# Patient Record
Sex: FEMALE | Race: WHITE
[De-identification: names, ages, dates, MRNs, and addresses within clinical notes are randomized per-mention and may not be internally consistent; named-entity substitution may affect disease eponyms.]

---

## 2018-06-11 ENCOUNTER — HOSPITAL ENCOUNTER (INPATIENT)
Dept: HOSPITAL 46 - FBCO | Age: 38
LOS: 2 days | Discharge: HOME | End: 2018-06-13
Attending: OBSTETRICS & GYNECOLOGY | Admitting: OBSTETRICS & GYNECOLOGY
Payer: COMMERCIAL

## 2018-06-11 VITALS — BODY MASS INDEX: 27.55 KG/M2 | HEIGHT: 65 IN | WEIGHT: 165.35 LBS

## 2018-06-11 DIAGNOSIS — N85.8: ICD-10-CM

## 2018-06-11 DIAGNOSIS — K21.9: ICD-10-CM

## 2018-06-11 DIAGNOSIS — O34.211: Primary | ICD-10-CM

## 2018-06-11 DIAGNOSIS — Z88.2: ICD-10-CM

## 2018-06-11 DIAGNOSIS — Z79.899: ICD-10-CM

## 2018-06-11 DIAGNOSIS — Z3A.37: ICD-10-CM

## 2018-06-11 PROCEDURE — C1763 CONN TISS, NON-HUMAN: HCPCS

## 2018-06-11 NOTE — OR
[facility]
                                    [address], [place]  [address]
_________________________________________________________________________________________
                                                                 Draft    
 
 
DATE OF OPERATION:
2018
 
SURGEON:
Kathleen Rivas MD
 
FIRST ASSISTANT:
Michael Carreno MD.
 
PREOPERATIVE DIAGNOSES:
Term pregnancy, previous  section, labor, breech presentation.
 
POSTOPERATIVE DIAGNOSES:
Term pregnancy, previous  section, labor, breech presentation, delivered.
 
PROCEDURE PERFORMED:
Repeat  section with lower segment transverse uterine incision.
 
ANESTHESIA:
Spinal.
 
ESTIMATED BLOOD LOSS:
500 mL.
 
DRAINS:
Chang catheter.
 
INDICATIONS AND FINDINGS:
The patient is a 37-year-old female,  2, para 1, admitted at 37 and 3/7th weeks
in early active labor.  Over course of observation, she had changed her cervix from 1 to
3 cm and was asia every 1 to 4 minutes.  Baby was also found to be breech.  At
the time of surgery, she was delivered of a little girl in a jesse breech via lower
segment transverse uterine incision with Apgars of 7 and 8 and weight is pending.
Uterus had some adhesions of the bladder flap over the anterior aspect of the uterus.
The uterus, tubes, ovaries, and placenta otherwise appeared normal.  There was a large
amount of fluid noted at delivery as well. 
 
DESCRIPTION OF PROCEDURE:
The patient was prepped and draped in the supine position.  The prior scar was removed
sharply.  The incision was extended down to the fascia.  The incision was extended
laterally and the inferior and superior fascial flaps were created.  The muscles were
sharply divided and the peritoneum entered sharply and the incision extended superiorly
 
                                                                                    
_________________________________________________________________________________________
PATIENT NAME:     GABRIELE CONCEPCION                        
MEDICAL RECORD #: [id number]            OPERATIVE REPORT              
          ACCT #: [id number]  
DATE OF BIRTH:   [birth year]            REPORT #: [id number]      
PHYSICIAN:        KATHLEEN RIVAS MD            
PCP:              KIMBERLEY BURGESS          
REPORT IS CONFIDENTIAL AND NOT TO BE RELEASED WITHOUT AUTHORIZATION
 
 
                                  [facility]
                                    [address], [place]  [address]
_________________________________________________________________________________________
                                                                 Draft    
 
 
and inferiorly.  The Rasheed retractor was then inserted.  Peritoneal adhesions were
filmy and were swept away digitally.  The uterine incision was made in the upper aspect
of the peritoneal reflection and the baby delivered with the above findings and handed
off to the pediatric staff in attendance.  The placenta was removed manually and the
uterus was explored with a lap tape assuring no remaining fragments.  The edges of the
incision were identified and the uterus was closed in 2 layers using #0 Monocryl.  The
first layer was a running locking stitch and the second was a vertical imbricating
stitch.  Bleeding points were controlled over the surface of the uterus from the
adhesions just with cautery.  The abdomen was copiously irrigated and inspected and
found to be hemostatic.  The retractor was removed and the peritoneum identified.  An
ACell graft was then laid over the lower segment to aid in healing.  The peritoneum was
then closed with a running suture of #3-0 Vicryl.  The muscles were brought together
with interrupted sutures of #0 Vicryl.  There was some bleeding at the upper raphae
underneath the fascia and this was controlled with two figure-of-eight sutures of #2-0
Vicryl.  Other bleeding points were controlled with cautery.  This layer was also
irrigated and seen to be hemostatic.  ACell powder was sprinkled over the muscles to aid
in healing.  The fascia was then closed from each angle to the midline with a running
suture of #0 Vicryl.  The subcu layer was irrigated and inspected and bleeding points
were controlled with cautery.  Interrupted sutures of #3-0 Vicryl were placed closing
the deep space.  The skin was closed with staples.  All sponge and needle counts were
correct.  She tolerated the procedure well and was taken to the recovery room in good
condition. 
 
 
 
            ________________________________________
            MD GRAHAM Goel/MODL
Job #:  [id number]
DD:  2018 15:17:13
DT:  2018 18:24:29
 
cc:            Michael Carreno MD
 
 
Copies:  MICHAEL CARRENO MD
~
 
 
 
 
                                                                                    
_________________________________________________________________________________________
PATIENT NAME:     GABRIELE CONCEPCION                        
MEDICAL RECORD #: [id number]            OPERATIVE REPORT              
          ACCT #: [id number]  
DATE OF BIRTH:   [birth year]            REPORT #: [id number]      
PHYSICIAN:        KATHLEEN RIVAS MD            
PCP:              KIMBERLEY BURGESS          
REPORT IS CONFIDENTIAL AND NOT TO BE RELEASED WITHOUT AUTHORIZATION

## 2018-06-11 NOTE — NUR
06/11/18 1519 Aliyah Moctezuma
1504 PT ARRIVED TO ROOM DROWSY, RESP EVEN AND UNLABORED ON RA. VSS. PT
DENIES AND NAUSEA. PT EDUCATION GIVEN ON RECOVERY PROCESS.
 
1515 HOB INCREASED TO 20 DEGREES, BP STABLE AND PT DENIES ANY PROBLEMS
AT HTIS TIME.

## 2018-06-13 NOTE — PR
[facility]
                                    [address] [facility]
                                  [place], [place]  [address]
_________________________________________________________________________________________
                                                                 Signed   
 
 
===================================
PP Progress Notes
===================================
Datetime Report Generated by CPTERRENCE: 2018 06:53
   
SUBJECTIVE:  [id number]
Pain:  Within normal limits
Nausea/Vomiting:  Denies
Flatus:  Yes
Vital Signs:  [id number]
Vital Signs:  Reviewed; Within Normal Limits
POSTPARTUM EXAM:  [id number]
Cardiovascular:  Not Done
Respiratory:  Not Done
Abdomen/Uterus:  Abnormal
Lochia:  Normal
Vulva/Perineum:  Not Done
Breasts:  Not Done
CVA Tenderness:  Not Done
Extremities:  Normal
 Incision:  Normal
Breastfeeding Progress:  Abnormal
Exam Comments:  Fundus firm, NT @ U-2.
IMPRESSION/PLAN/PROCEDURES:  [id number]
Impression:  Normal postpartum progression
Plan:  Remove staples; Discharge
Other Plans:  ambulate, shower
Procedures:  None
Progress Notes:  Doing well.  She is ready for D/C.
Signing Physician:  Kathleen Rivas MD
 
 
Copies:                                
~
 
 
 
 
 
 
 
 
 
*Electronically Signed*  18  0653  KATHLEEN RIVAS MD            
                                                                       
_________________________________________________________________________________________
PATIENT NAME:     GABRIELE CONCEPCION                       
MEDICAL RECORD #: [id number]                     PROGRESS NOTE                 
          ACCT #: [id number]  
DATE OF BIRTH:   [birth year]                                       
PHYSICIAN:   KATHLEEN RIVAS MD                   RPT #: [id number]
REPORT IS CONFIDENTIAL AND NOT TO BE RELEASED WITHOUT AUTHORIZATION

## 2018-06-17 ENCOUNTER — HOSPITAL ENCOUNTER (EMERGENCY)
Dept: HOSPITAL 46 - ED | Age: 38
LOS: 1 days | Discharge: HOME | End: 2018-06-18
Payer: COMMERCIAL

## 2018-06-17 VITALS — BODY MASS INDEX: 24.99 KG/M2 | HEIGHT: 65 IN | WEIGHT: 150 LBS

## 2018-06-17 DIAGNOSIS — K29.00: Primary | ICD-10-CM

## 2018-06-17 DIAGNOSIS — Z88.2: ICD-10-CM

## 2018-06-22 ENCOUNTER — HOSPITAL ENCOUNTER (EMERGENCY)
Dept: HOSPITAL 46 - ED | Age: 38
LOS: 1 days | Discharge: HOME | End: 2018-06-23
Payer: COMMERCIAL

## 2018-06-22 VITALS — HEIGHT: 65 IN | BODY MASS INDEX: 24.99 KG/M2 | WEIGHT: 150 LBS

## 2018-06-22 DIAGNOSIS — O99.89: Primary | ICD-10-CM

## 2018-06-22 DIAGNOSIS — Z88.2: ICD-10-CM

## 2018-06-22 DIAGNOSIS — Z79.899: ICD-10-CM

## 2018-06-22 DIAGNOSIS — R11.0: ICD-10-CM
